# Patient Record
Sex: MALE | Race: AMERICAN INDIAN OR ALASKA NATIVE | URBAN - NONMETROPOLITAN AREA
[De-identification: names, ages, dates, MRNs, and addresses within clinical notes are randomized per-mention and may not be internally consistent; named-entity substitution may affect disease eponyms.]

---

## 2017-03-31 ENCOUNTER — FOLLOW UP ESTABLISHED (OUTPATIENT)
Dept: URBAN - NONMETROPOLITAN AREA CLINIC 3 | Facility: CLINIC | Age: 57
End: 2017-03-31
Payer: COMMERCIAL

## 2017-03-31 DIAGNOSIS — E10.3393 DIABETES MELLITUS TYPE 1 WITH MODERATE NON-PROLIFE: ICD-10-CM

## 2017-03-31 DIAGNOSIS — Z01.01 ENCOUNTER FOR EXAM OF EYES AND VISION W ABNORMAL FINDINGS: Primary | ICD-10-CM

## 2017-03-31 DIAGNOSIS — H52.4 PRESBYOPIA: ICD-10-CM

## 2017-03-31 PROCEDURE — 92015 DETERMINE REFRACTIVE STATE: CPT | Performed by: OPTOMETRIST

## 2017-03-31 PROCEDURE — 92014 COMPRE OPH EXAM EST PT 1/>: CPT | Performed by: OPTOMETRIST

## 2017-03-31 ASSESSMENT — VISUAL ACUITY
OD: 20/30
OS: 20/20

## 2017-03-31 ASSESSMENT — INTRAOCULAR PRESSURE
OS: 14
OD: 15

## 2017-04-11 ENCOUNTER — FOLLOW UP ESTABLISHED (OUTPATIENT)
Dept: URBAN - NONMETROPOLITAN AREA CLINIC 3 | Facility: CLINIC | Age: 57
End: 2017-04-11
Payer: COMMERCIAL

## 2017-04-11 DIAGNOSIS — E11.3313 DIABETES MELLITUS TYPE 2 WITH MODERATE NON-PROLIFE: Primary | ICD-10-CM

## 2017-04-11 DIAGNOSIS — H43.12 VITREOUS HEMORRHAGE, LEFT EYE: ICD-10-CM

## 2017-04-11 PROCEDURE — 92014 COMPRE OPH EXAM EST PT 1/>: CPT | Performed by: OPHTHALMOLOGY

## 2017-04-11 PROCEDURE — 92134 CPTRZ OPH DX IMG PST SGM RTA: CPT | Performed by: OPHTHALMOLOGY

## 2017-04-11 ASSESSMENT — INTRAOCULAR PRESSURE
OD: 16
OS: 18

## 2021-02-12 ENCOUNTER — OFFICE VISIT (OUTPATIENT)
Dept: URBAN - METROPOLITAN AREA CLINIC 41 | Facility: CLINIC | Age: 61
End: 2021-02-12
Payer: COMMERCIAL

## 2021-02-12 DIAGNOSIS — H43.11 VITREOUS HEMORRHAGE, RIGHT EYE: ICD-10-CM

## 2021-02-12 PROCEDURE — 99214 OFFICE O/P EST MOD 30 MIN: CPT | Performed by: OPHTHALMOLOGY

## 2021-02-12 PROCEDURE — 92134 CPTRZ OPH DX IMG PST SGM RTA: CPT | Performed by: OPHTHALMOLOGY

## 2021-02-12 ASSESSMENT — INTRAOCULAR PRESSURE
OD: 15
OS: 20

## 2021-02-12 NOTE — IMPRESSION/PLAN
Impression: Vitreous hemorrhage, right eye: H43.11. OD. Status: Symptomatic.  Plan: Resolved s/p PPV

## 2021-02-12 NOTE — IMPRESSION/PLAN
Impression: Type 2 diab with prolif diab rtnop with macular edema, bi: [id number]. OU. H/o PPV/EL OS 12/06/17 ([facility])
s/p PPV, MP, EL, Avastin x Vh,TRD, OD (11/06/18) SI
OCT OU= stable IRF OD, no SRF/IRF /266
s/p TEODORO OD 7/23/20 Plan: OD: PDR stable; does have improved DME @ 1m, stable @ 2m, now stable @ 5m (lost to f/u) = rec obs OS:  PDR stable s/p PRP = rec obs Systemic control stressed. Ok for MRX vs CE/IOL. 3m OCT OU re-eval Teodoro OU

## 2021-05-27 ENCOUNTER — OFFICE VISIT (OUTPATIENT)
Dept: URBAN - METROPOLITAN AREA CLINIC 41 | Facility: CLINIC | Age: 61
End: 2021-05-27
Payer: COMMERCIAL

## 2021-05-27 DIAGNOSIS — H25.13 AGE-RELATED NUCLEAR CATARACT, BILATERAL: ICD-10-CM

## 2021-05-27 PROCEDURE — 92134 CPTRZ OPH DX IMG PST SGM RTA: CPT | Performed by: OPHTHALMOLOGY

## 2021-05-27 PROCEDURE — 99213 OFFICE O/P EST LOW 20 MIN: CPT | Performed by: OPHTHALMOLOGY

## 2021-05-27 ASSESSMENT — INTRAOCULAR PRESSURE
OD: 18
OS: 28

## 2021-05-27 NOTE — IMPRESSION/PLAN
Impression: Age-related nuclear cataract, bilateral: H25.13. Plan: No retinal contraindication for cataract surgery. D/w patient that retinal/macular pathology may limit final visual outcome. Patient voices understanding.

## 2021-05-27 NOTE — IMPRESSION/PLAN
Impression: Type 2 diab with prolif diab rtnop with macular edema, bi: [id number]. OU. H/o PPV/EL OS 12/06/17 ([facility])
s/p PPV, MP, EL, Avastin x Vh,TRD, OD (11/06/18) SI
OCT OU= stable IRF OD, no SRF/IRF  / 240 
s/p TEODORO OD 09/21/2020 Plan: OD: PDR stable; does have improved DME @ 1m, stable @ 2m, now stable @ 5m (lost to f/u) = rec obs OS:  PDR stable s/p PRP = rec obs Systemic control stressed. Ok for MRX vs CE/IOL. 3m OCT OU re-eval Teodoro OU

## 2021-09-09 ENCOUNTER — OFFICE VISIT (OUTPATIENT)
Dept: URBAN - METROPOLITAN AREA CLINIC 41 | Facility: CLINIC | Age: 61
End: 2021-09-09
Payer: COMMERCIAL

## 2021-09-09 DIAGNOSIS — H40.052 OCULAR HYPERTENSION, LEFT EYE: ICD-10-CM

## 2021-09-09 PROCEDURE — 92134 CPTRZ OPH DX IMG PST SGM RTA: CPT | Performed by: OPHTHALMOLOGY

## 2021-09-09 PROCEDURE — 99214 OFFICE O/P EST MOD 30 MIN: CPT | Performed by: OPHTHALMOLOGY

## 2021-09-09 RX ORDER — BIMATOPROST 0.1 MG/ML
0.01 % SOLUTION/ DROPS OPHTHALMIC
Qty: 5 | Refills: 3 | Status: INACTIVE
Start: 2021-09-09 | End: 2021-09-10

## 2021-09-09 ASSESSMENT — INTRAOCULAR PRESSURE
OD: 19
OS: 31

## 2021-09-09 NOTE — IMPRESSION/PLAN
Impression: Type 2 diab with prolif diab rtnop with macular edema, bi: [id number]. OU. H/o PPV/EL OS 12/06/17 ([facility])
s/p PPV, MP, EL, Avastin x Vh,TRD, OD (11/06/18) SI
OCT OU= stable IRF OD, no SRF/IRF OS  / 170 
s/p TEODORO OD 09/21/2020 Plan: OD: PDR stable; min DME that is stable without treatment = rec obs OS:  PDR stable s/p PRP = rec obs Systemic control stressed. Ok for MRX vs CE/IOL. 6m OCT OU re-eval Teodoro OU

## 2021-09-09 NOTE — IMPRESSION/PLAN
Impression: Ocular hypertension, left eye: H40.052. Plan: Was started on drops by Dr. Bhanu Saldaña one month ago. No longer using drops. Start lumigan qhs OS and f/u with Dr. Bhanu Saldaña. 

IOP check in 2 weeks here or with Dr. Bhanu Saldaña

## 2022-01-24 ENCOUNTER — OFFICE VISIT (OUTPATIENT)
Dept: URBAN - METROPOLITAN AREA CLINIC 13 | Facility: CLINIC | Age: 62
End: 2022-01-24
Payer: COMMERCIAL

## 2022-01-24 DIAGNOSIS — Z96.1 PRESENCE OF INTRAOCULAR LENS: ICD-10-CM

## 2022-01-24 DIAGNOSIS — H40.89 OTHER SPECIFIED GLAUCOMA: ICD-10-CM

## 2022-01-24 DIAGNOSIS — E11.3513 TYPE 2 DIAB WITH PROLIF DIAB RTNOP WITH MACULAR EDEMA, BI: Primary | ICD-10-CM

## 2022-01-24 PROCEDURE — 76512 OPH US DX B-SCAN: CPT | Performed by: OPHTHALMOLOGY

## 2022-01-24 PROCEDURE — 92134 CPTRZ OPH DX IMG PST SGM RTA: CPT | Performed by: OPHTHALMOLOGY

## 2022-01-24 PROCEDURE — 67028 INJECTION EYE DRUG: CPT | Performed by: OPHTHALMOLOGY

## 2022-01-24 PROCEDURE — 99214 OFFICE O/P EST MOD 30 MIN: CPT | Performed by: OPHTHALMOLOGY

## 2022-01-24 RX ORDER — PREDNISOLONE ACETATE 10 MG/ML
1 % SUSPENSION/ DROPS OPHTHALMIC
Qty: 5 | Refills: 3 | Status: ACTIVE
Start: 2022-01-24

## 2022-01-24 RX ORDER — OFLOXACIN 3 MG/ML
0.3 % SOLUTION/ DROPS OPHTHALMIC
Qty: 5 | Refills: 3 | Status: ACTIVE
Start: 2022-01-24

## 2022-01-24 RX ORDER — ACETAZOLAMIDE 250 MG/1
250 MG TABLET ORAL
Qty: 120 | Refills: 1 | Status: INACTIVE
Start: 2022-01-24 | End: 2022-02-22

## 2022-01-24 ASSESSMENT — INTRAOCULAR PRESSURE
OS: 46
OD: 20

## 2022-01-24 NOTE — IMPRESSION/PLAN
Impression: Vitreous hemorrhage, left eye: H43.12. Plan: There is a non-clearing vitreous hemorrhage (VH). Despite adequate time, the [facility] has failed to clear on its own. We discussed the natural history of the disease and the risks, benefits, indications, and alternatives to vitrectomy with laser. The risks of vitrectomy include but are not limited to infection, retinal tear or detachment, glaucoma, cataract formation in a phakic patient, hemorrhage, loss of eye and blindness, recurrent VH, and need for additional surgery. The patient elects to proceed with vitrectomy surgery.  

PLAN: PPV/EL/poss gas x VH/NVG

## 2022-01-24 NOTE — IMPRESSION/PLAN
Impression: NVG: H40.89. Left. Plan: Currently on cosopt and brimonidine CSM and resume lumigan (has at home)
start diamox 500 mg BID Will refer

## 2022-01-24 NOTE — IMPRESSION/PLAN
Impression: Type 2 diab with prolif diab rtnop with macular edema, bi: [id number]. OU. H/o PPV/EL OS 12/06/17 ([facility])
s/p PPV, MP, EL, Avastin x Vh,TRD, OD (11/06/18) SI
OCT OU= stable IRF OD , no view OS 
s/p LUIS FERNANDO OD 09/21/2020 Plan: OD: PDR stable s/p PRP; min DME that is stable without treatment = rec obs OS:  PDR s/p PRP, now with NVG, VH, despite being on max drop  = rec B-scan, Avastin, PPV/EL, glaucoma referral OS; start diamox 500mg PO BID Discussed R,B,A of Avastin vs Lucentis vs Eylea vs Beovu injection. Discussed no FDA approval with Avastin and compounding risk. Discussed signs and symptoms of inflammation, endophthalmitis, vitreous hemorrhage, retinal tear, retinal detachment. Patient understands and wishes to proceed with Avastin injection today. Timeout was performed before procedure. AVASTIN INJECTION COMPLETED TODAY as per protocol without complications. 

Post tap IOP = 14

this week for PPV/EL OS

## 2022-01-26 ENCOUNTER — Encounter (OUTPATIENT)
Dept: URBAN - METROPOLITAN AREA EXTERNAL CLINIC 14 | Facility: EXTERNAL CLINIC | Age: 62
End: 2022-01-26
Payer: COMMERCIAL

## 2022-01-26 ENCOUNTER — POST-OPERATIVE VISIT (OUTPATIENT)
Dept: URBAN - METROPOLITAN AREA CLINIC 54 | Facility: CLINIC | Age: 62
End: 2022-01-26
Payer: COMMERCIAL

## 2022-01-26 PROCEDURE — 67040 LASER TREATMENT OF RETINA: CPT | Performed by: OPHTHALMOLOGY

## 2022-01-26 PROCEDURE — 99024 POSTOP FOLLOW-UP VISIT: CPT | Performed by: OPHTHALMOLOGY

## 2022-01-26 ASSESSMENT — INTRAOCULAR PRESSURE
OS: 18
OD: 20
OD: 20
OS: 18

## 2022-01-26 NOTE — IMPRESSION/PLAN
Impression: S/P  PPV/EL/poss gas x VH OS - . Vitreous hemorrhage, left eye  H43.12. Plan: No s/s of RD/infection VA/IOP acceptable Repatched after exam.  Keep patch on x 24 hours then remove - instructions given. Start using drops as below tomorrow. Post-operative instructions and precautions Reviewed. 
Call ASAP with changes
--Continue Ocuflox QID--Continue PF QID

1 week POS

## 2022-02-07 ENCOUNTER — POST-OPERATIVE VISIT (OUTPATIENT)
Dept: URBAN - METROPOLITAN AREA CLINIC 41 | Facility: CLINIC | Age: 62
End: 2022-02-07
Payer: COMMERCIAL

## 2022-02-07 DIAGNOSIS — Z48.810 ENCOUNTER FOR SURGICAL AFTERCARE FOLLOWING SURGERY ON THE SENSE ORGANS: ICD-10-CM

## 2022-02-07 PROCEDURE — 99024 POSTOP FOLLOW-UP VISIT: CPT | Performed by: OPHTHALMOLOGY

## 2022-02-07 ASSESSMENT — INTRAOCULAR PRESSURE
OS: 25
OD: 14

## 2022-02-07 NOTE — IMPRESSION/PLAN
Impression: S/P  PPV/EL/poss gas x VH OS - 12 Days. Vitreous hemorrhage, left eye  H43.12. Plan: No s/s of RD/infection VA acceptable IOP borderline - resume cosopt and brimonidine as per pre-op Post-operative instructions and precautions Reviewed. Call ASAP with changes --Taper Prednisolone acetate 1% TID x 1 wk, BID x 1wk, QD x 1wk, then d/c
--Discontinue Ocuflox 1 month POS/OCT (poss Avastin x NVI)

## 2022-03-11 ENCOUNTER — POST-OPERATIVE VISIT (OUTPATIENT)
Dept: URBAN - METROPOLITAN AREA CLINIC 41 | Facility: CLINIC | Age: 62
End: 2022-03-11
Payer: COMMERCIAL

## 2022-03-11 PROCEDURE — 99024 POSTOP FOLLOW-UP VISIT: CPT | Performed by: OPHTHALMOLOGY

## 2022-03-11 RX ORDER — BIMATOPROST 0.3 MG/ML
0.03 % SOLUTION/ DROPS OPHTHALMIC
Qty: 5 | Refills: 3 | Status: INACTIVE
Start: 2022-03-11 | End: 2022-03-11

## 2022-03-11 RX ORDER — DORZOLAMIDE HYDROCHLORIDE AND TIMOLOL MALEATE 20; 5 MG/ML; MG/ML
SOLUTION/ DROPS OPHTHALMIC
Qty: 5 | Refills: 3 | Status: INACTIVE
Start: 2022-03-11 | End: 2022-04-09

## 2022-03-11 ASSESSMENT — INTRAOCULAR PRESSURE
OD: 14
OS: 27

## 2022-03-11 NOTE — IMPRESSION/PLAN
Impression: S/P  PPV/EL/poss gas x VH OS - 44 Days. Vitreous hemorrhage, left eye  H43.12.
OCT OU - mild IRF OD min IRF OS  / 251 Plan: No s/s of RD/infection VA acceptable Post-operative instructions and precautions Reviewed. Call ASAP with changes IOP somewhat elevated, but ran out of drops Resume latanaprost QHS and cosopt BID (OS - no contra) Rec f/u with Dr. Donna Bunn in 1m and with us 3m OCT OU